# Patient Record
Sex: MALE | Race: WHITE | URBAN - METROPOLITAN AREA
[De-identification: names, ages, dates, MRNs, and addresses within clinical notes are randomized per-mention and may not be internally consistent; named-entity substitution may affect disease eponyms.]

---

## 2024-04-03 ENCOUNTER — HOSPITAL ENCOUNTER (OUTPATIENT)
Age: 44
Discharge: HOME | End: 2024-04-03
Attending: EMERGENCY MEDICINE

## 2024-04-03 VITALS
TEMPERATURE: 98 F | HEART RATE: 72 BPM | DIASTOLIC BLOOD PRESSURE: 87 MMHG | OXYGEN SATURATION: 97 % | SYSTOLIC BLOOD PRESSURE: 140 MMHG | RESPIRATION RATE: 18 BRPM

## 2024-04-03 DIAGNOSIS — K08.89 TOOTH ACHE: Primary | ICD-10-CM

## 2024-04-03 DIAGNOSIS — R22.0 GINGIVAL SWELLING: ICD-10-CM

## 2024-04-03 PROCEDURE — 99203 OFFICE O/P NEW LOW 30 MIN: CPT

## 2024-04-03 RX ORDER — HYDROCODONE BITARTRATE AND ACETAMINOPHEN 5; 325 MG/1; MG/1
1 TABLET ORAL EVERY 6 HOURS PRN
Qty: 10 TABLET | Refills: 0 | Status: SHIPPED | OUTPATIENT
Start: 2024-04-03 | End: 2024-04-08

## 2024-04-03 RX ORDER — AMOXICILLIN 875 MG/1
875 TABLET, COATED ORAL 2 TIMES DAILY
Qty: 20 TABLET | Refills: 0 | Status: SHIPPED | OUTPATIENT
Start: 2024-04-03 | End: 2024-04-13

## 2024-04-03 NOTE — ED INITIAL ASSESSMENT (HPI)
Patient with right sided face pain. Has poor dentition. States his bottom gums are swollen and started this morning   Patient without fevers

## 2024-04-03 NOTE — ED PROVIDER NOTES
Patient Seen in: [facility] [place]      History     Chief Complaint   Patient presents with    Dental Problem     Stated Complaint: Jaw Problem; Face Issues    Subjective:   HPI    Patient is a 43-year-old male with no significant past medical history who presents now with tooth ache, facial swelling.  The patient states the symptoms started yesterday.  Patient states he has diffuse dental decay.  The patient developed pain and swelling to the right lower dental area yesterday.  The patient denies any fever.    Objective:   No pertinent past medical history.            No pertinent past surgical history.              No pertinent social history.            Review of Systems    Positive for stated complaint: Jaw Problem; Face Issues  Other systems are as noted in HPI.  Constitutional and vital signs reviewed.      All other systems reviewed and negative except as noted above.    Physical Exam     ED Triage Vitals [04/03/24 1617]   /87   Pulse 72   Resp 18   Temp 97.8 °F (36.6 °C)   Temp src Temporal   SpO2 97 %   O2 Device None (Room air)       Current:/87   Pulse 72   Temp 97.8 °F (36.6 °C) (Temporal)   Resp 18   SpO2 97%         Physical Exam    Constitutional: Well-developed well-nourished in no acute distress  Head: Normocephalic, no swelling or tenderness  Eyes: Nonicteric sclera, no conjunctival injection  ENT: TMs are clear and flat bilaterally.  There is no posterior pharyngeal erythema.  There is mild swelling and tenderness to the gingival area below the the right lower lateral incisor.  There is diffuse dental decay with obvious caries and 2 teeth in this area.  There is no obvious fluctuance.  Chest: Clear to auscultation, no tenderness  Cardiovascular: Regular rate and rhythm without murmur  Abdomen: Soft, nontender and nondistended  Neurologic: Patient is awake, alert and oriented ×3.  The patient's motor strength is 5 out of 5 and symmetric in the upper and lower extremities  bilaterally  Extremities: No focal swelling or tenderness  Skin: No pallor, no redness or warmth to the touch      ED Course   Labs Reviewed - No data to display          Will initiate amoxicillin, pain control.  Will provide follow-up with both primary care and [facility]         MDM      Dental decay versus dental abscess                                   Medical Decision Making      Disposition and Plan     Clinical Impression:  1. Tooth ache    2. Gingival swelling         Disposition:  Discharge  4/3/2024  4:17 pm    Follow-up:  Zachary Salcedo, DO  [address] [place] [id number] [phone]      Any medical problems    [facility] [address] [address]  [phone]    Call for an appointment          Medications Prescribed:  Current Discharge Medication List        START taking these medications    Details   amoxicillin 875 MG Oral Tab Take 1 tablet (875 mg total) by mouth 2 (two) times daily for 10 days.  Qty: 20 tablet, Refills: 0      HYDROcodone-acetaminophen 5-325 MG Oral Tab Take 1 tablet by mouth every 6 (six) hours as needed for Pain.  Qty: 10 tablet, Refills: 0    Associated Diagnoses: Tooth ache